# Patient Record
Sex: MALE | Race: WHITE | NOT HISPANIC OR LATINO | Employment: FULL TIME | URBAN - METROPOLITAN AREA
[De-identification: names, ages, dates, MRNs, and addresses within clinical notes are randomized per-mention and may not be internally consistent; named-entity substitution may affect disease eponyms.]

---

## 2023-10-23 ENCOUNTER — TELEPHONE (OUTPATIENT)
Dept: NEUROLOGY | Facility: CLINIC | Age: 53
End: 2023-10-23
Payer: COMMERCIAL

## 2023-10-23 DIAGNOSIS — R56.9 FIRST TIME SEIZURE (MULTI): Primary | ICD-10-CM

## 2023-10-23 NOTE — TELEPHONE ENCOUNTER
Patient called in stating that he was supposed to have labs ordered but when he went to have his labs drawn at the [facility], they did not have anything on file. I did not see this order in his chart. Can you place order?     Patient would like us to call once ordered and faxed to [facility] at [phone]

## 2024-02-07 ENCOUNTER — OFFICE VISIT (OUTPATIENT)
Dept: NEUROLOGY | Facility: CLINIC | Age: 54
End: 2024-02-07
Payer: COMMERCIAL

## 2024-02-07 DIAGNOSIS — R56.9 SEIZURE (MULTI): Primary | ICD-10-CM

## 2024-02-07 DIAGNOSIS — Q28.3: ICD-10-CM

## 2024-02-07 PROCEDURE — 99214 OFFICE O/P EST MOD 30 MIN: CPT | Performed by: PSYCHIATRY & NEUROLOGY

## 2024-02-07 RX ORDER — LEVETIRACETAM 750 MG/1
750 TABLET ORAL 2 TIMES DAILY
COMMUNITY
End: 2024-02-07 | Stop reason: SDUPTHER

## 2024-02-07 RX ORDER — LEVETIRACETAM 750 MG/1
750 TABLET ORAL 2 TIMES DAILY
Qty: 60 TABLET | Refills: 11 | Status: SHIPPED | OUTPATIENT
Start: 2024-02-07 | End: 2024-05-09 | Stop reason: SDUPTHER

## 2024-02-07 NOTE — PROGRESS NOTES
Chief Complaint: Seizure    HPI  53 y.o. male presenting for follow up regarding seizures.    The patient denies any specific seizures since the last visit.  Specifically, there was no evidence of loss of consciousness, convulsions, myoclonus, staring spells, alise vu, jammais vu, olfactory aura, gustatory aura, rising epigastric aura, or aphasia.    His last seizure was 1 year ago.    He is on Keppra 750 mg BID.  He denies any side effects.        Current Outpatient Medications:     levETIRAcetam (Keppra) 750 mg tablet, Take 1 tablet (750 mg) by mouth 2 times a day., Disp: , Rfl:      Objective:  Gen: NAD  Neuro:  --HIF: A&O X 3, repetition and naming intact  --CN:  PERRLA, EOMI, VFF, no visible facial asymmetry, facial sensation intact, no tongue or palatal deviation, SCM intact  --Motor: Moves all 4 extremities equally; no focal deficits  --Sensory: Intact to light touch, intact to pinprick  --Reflex: 2+ symmetric, toes down  --Cerebellum: FTN and HTS intact  --Gait: Normal, narrow based gait    Relevant Results      Assessment:   First Time Seizure  Developmental Venous Anomaly    Plan:  Continue Keppra 750 mg BID  Repeat MRI Brain with contrast to assess stability  Okay to drive as he has been seizure free for greater than 6 months  Follow up in 6 months    Loyd Noriega MD  [facility]  Department of Neurology

## 2024-05-01 ENCOUNTER — HOSPITAL ENCOUNTER (OUTPATIENT)
Dept: RADIOLOGY | Facility: CLINIC | Age: 54
Discharge: HOME | End: 2024-05-01
Payer: COMMERCIAL

## 2024-05-01 DIAGNOSIS — Q28.3: ICD-10-CM

## 2024-05-01 DIAGNOSIS — R56.9 SEIZURE (MULTI): ICD-10-CM

## 2024-05-01 PROCEDURE — 70553 MRI BRAIN STEM W/O & W/DYE: CPT | Performed by: RADIOLOGY

## 2024-05-01 PROCEDURE — A9575 INJ GADOTERATE MEGLUMI 0.1ML: HCPCS | Performed by: PSYCHIATRY & NEUROLOGY

## 2024-05-01 PROCEDURE — 70553 MRI BRAIN STEM W/O & W/DYE: CPT

## 2024-05-01 PROCEDURE — 2550000001 HC RX 255 CONTRASTS: Performed by: PSYCHIATRY & NEUROLOGY

## 2024-05-01 RX ORDER — GADOTERATE MEGLUMINE 376.9 MG/ML
16 INJECTION INTRAVENOUS
Status: COMPLETED | OUTPATIENT
Start: 2024-05-01 | End: 2024-05-01

## 2024-05-01 RX ADMIN — GADOTERATE MEGLUMINE 16 ML: 376.9 INJECTION INTRAVENOUS at 14:23

## 2024-05-09 ENCOUNTER — PATIENT MESSAGE (OUTPATIENT)
Dept: NEUROLOGY | Facility: CLINIC | Age: 54
End: 2024-05-09
Payer: COMMERCIAL

## 2024-05-09 DIAGNOSIS — Q28.3: ICD-10-CM

## 2024-05-09 DIAGNOSIS — R56.9 SEIZURE (MULTI): ICD-10-CM

## 2024-05-09 RX ORDER — LEVETIRACETAM 750 MG/1
750 TABLET ORAL 2 TIMES DAILY
Qty: 60 TABLET | Refills: 11 | Status: SHIPPED | OUTPATIENT
Start: 2024-05-09

## 2024-08-07 ENCOUNTER — APPOINTMENT (OUTPATIENT)
Dept: NEUROLOGY | Facility: CLINIC | Age: 54
End: 2024-08-07
Payer: COMMERCIAL

## 2024-09-07 ENCOUNTER — PATIENT MESSAGE (OUTPATIENT)
Dept: NEUROLOGY | Facility: CLINIC | Age: 54
End: 2024-09-07
Payer: COMMERCIAL

## 2024-09-07 DIAGNOSIS — Q28.3: ICD-10-CM

## 2024-09-07 DIAGNOSIS — R56.9 SEIZURE (MULTI): ICD-10-CM

## 2024-09-09 RX ORDER — LEVETIRACETAM 750 MG/1
750 TABLET ORAL 2 TIMES DAILY
Qty: 60 TABLET | Refills: 11 | Status: SHIPPED | OUTPATIENT
Start: 2024-09-09

## 2024-11-21 ENCOUNTER — APPOINTMENT (OUTPATIENT)
Dept: NEUROLOGY | Facility: CLINIC | Age: 54
End: 2024-11-21
Payer: COMMERCIAL

## 2024-11-21 VITALS
DIASTOLIC BLOOD PRESSURE: 78 MMHG | WEIGHT: 178.6 LBS | TEMPERATURE: 97.9 F | HEART RATE: 68 BPM | BODY MASS INDEX: 26.45 KG/M2 | SYSTOLIC BLOOD PRESSURE: 124 MMHG | HEIGHT: 69 IN

## 2024-11-21 DIAGNOSIS — R56.9 SEIZURE (MULTI): ICD-10-CM

## 2024-11-21 DIAGNOSIS — Q28.3: ICD-10-CM

## 2024-11-21 PROCEDURE — 3008F BODY MASS INDEX DOCD: CPT | Performed by: PSYCHIATRY & NEUROLOGY

## 2024-11-21 PROCEDURE — 99214 OFFICE O/P EST MOD 30 MIN: CPT | Performed by: PSYCHIATRY & NEUROLOGY

## 2024-11-21 RX ORDER — LEVETIRACETAM 750 MG/1
750 TABLET ORAL 2 TIMES DAILY
Qty: 60 TABLET | Refills: 11 | Status: SHIPPED | OUTPATIENT
Start: 2024-11-21

## 2024-11-21 ASSESSMENT — LIFESTYLE VARIABLES
HOW OFTEN DO YOU HAVE SIX OR MORE DRINKS ON ONE OCCASION: NEVER
HOW OFTEN DO YOU HAVE A DRINK CONTAINING ALCOHOL: 2-3 TIMES A WEEK
SKIP TO QUESTIONS 9-10: 1
HOW MANY STANDARD DRINKS CONTAINING ALCOHOL DO YOU HAVE ON A TYPICAL DAY: 1 OR 2
AUDIT-C TOTAL SCORE: 3

## 2024-11-21 ASSESSMENT — PATIENT HEALTH QUESTIONNAIRE - PHQ9
1. LITTLE INTEREST OR PLEASURE IN DOING THINGS: NOT AT ALL
SUM OF ALL RESPONSES TO PHQ9 QUESTIONS 1 & 2: 0
2. FEELING DOWN, DEPRESSED OR HOPELESS: NOT AT ALL

## 2024-11-21 NOTE — PROGRESS NOTES
Chief Complaint: Seizure    HPI  54 y.o. male presenting for follow up regarding seizures.    The patient denies any specific seizures since the last visit.  Specifically, there was no evidence of loss of consciousness, convulsions, myoclonus, staring spells, alise vu, jammais vu, olfactory aura, gustatory aura, rising epigastric aura, or aphasia.    He is on Keppra 750 mg BIDE.  He notes mild drowsiness.          Current Outpatient Medications:     levETIRAcetam (Keppra) 750 mg tablet, Take 1 tablet (750 mg) by mouth 2 times a day., Disp: 60 tablet, Rfl: 11      Objective:  Gen: NAD  Neuro:  --HIF: A&O X 3, repetition and naming intact  --CN:  PERRLA, EOMI, VFF, no visible facial asymmetry, facial sensation intact, no tongue or palatal deviation, SCM intact  --Motor: Moves all 4 extremities equally; no focal deficits  --Sensory: Intact to light touch, intact to pinprick  --Reflex: 2+ symmetric, toes down  --Cerebellum: FTN and HTS intact  --Gait: Normal, narrow based gait    Relevant Results      Assessment:    First Time Seizure  Developmental Venous Anomaly    Plan:  - continue Keppra 750 mg BID  - consider reducing dose to 500 mg BID  - follow up in 6 months    Loyd Noriega MD  [facility]  Department of Neurology

## 2025-05-09 ENCOUNTER — OFFICE VISIT (OUTPATIENT)
Dept: URGENT CARE | Age: 55
End: 2025-05-09
Payer: COMMERCIAL

## 2025-05-09 VITALS
RESPIRATION RATE: 16 BRPM | DIASTOLIC BLOOD PRESSURE: 75 MMHG | OXYGEN SATURATION: 99 % | WEIGHT: 175 LBS | HEIGHT: 69 IN | SYSTOLIC BLOOD PRESSURE: 113 MMHG | BODY MASS INDEX: 25.92 KG/M2 | HEART RATE: 65 BPM | TEMPERATURE: 97.9 F

## 2025-05-09 DIAGNOSIS — H60.393 OTHER INFECTIVE ACUTE OTITIS EXTERNA OF BOTH EARS: Primary | ICD-10-CM

## 2025-05-09 RX ORDER — NEOMYCIN SULFATE, POLYMYXIN B SULFATE, HYDROCORTISONE 3.5; 10000; 1 MG/ML; [USP'U]/ML; MG/ML
3 SOLUTION/ DROPS AURICULAR (OTIC) 4 TIMES DAILY
Qty: 4.2 ML | Refills: 0 | Status: SHIPPED | OUTPATIENT
Start: 2025-05-09 | End: 2025-05-16

## 2025-05-09 NOTE — PROGRESS NOTES
"Subjective   Patient ID: Luis Haddad is a 54 y.o. male. They present today with a chief complaint of No chief complaint on file..    History of Present Illness  Subjective  Luis Haddad is a 54 y.o. male who presents for evaluation of bilateral ear pain and plugged sensation in both ears. Symptoms have been present 2 weeks. Associated symptoms include earaches. Patient denies ear drainage, epistaxis, hoarseness, nasal congestion, sore throat, and tinnitus. He does not have a history of ear infections. He does have a history of swimming. He has tried OTC swimmer's ear drops for his symptoms.    Review of Systems   Constitutional:  Denies fever, chills, malaise, fatigue   ENT: See HPI  Respiratory:  Denies cough, sputum production, shortness of breath, wheezing.    Cardiovascular:  Denies chest pain, palpitations, syncope, lightheadedness, dizziness.    Gastrointestinal:  Denies abdominal pain, nausea, vomiting, diarrhea.    Integumentary:  Denies rash.    All other systems are negative    Objective  /75   Pulse 65   Temp 36.6 °C (97.9 °F)   Resp 16   Ht 1.753 m (5' 9\")   Wt 79.4 kg (175 lb)   SpO2 99%   BMI 25.84 kg/m²   Physical Exam  Vitals and nursing note reviewed.   Constitutional:       General: He is not in acute distress. Alert and oriented.      Appearance: Normal appearance. He is normal weight. He is not ill-appearing, toxic-appearing or diaphoretic.   Right Ear:  right TM normal landmarks and mobility and right canal red and inflamed  HENT:      Nose: Nose normal. No congestion or rhinorrhea.   Left Ear:   left TM normal landmarks and mobility and left canal red and inflamed  Eyes:      General: No scleral icterus.        Right eye: No discharge.         Left eye: No discharge.      Conjunctiva/sclera: Conjunctivae normal.   Cardiovascular:      Rate and Rhythm: Normal rate.   Pulmonary:      Effort: Pulmonary effort is normal.   Skin:     General: Skin is warm and dry.      Coloration: " Skin is not jaundiced or pale.      Findings: No bruising, erythema or rash.   Neurological:      General: No focal deficit present.      Mental Status: He is alert and oriented to person, place, and time.             History provided by:  Patient   used: No        Past Medical History  Allergies as of 05/09/2025 - Reviewed 05/09/2025   Allergen Reaction Noted    Penicillins Rash and Unknown 06/06/2006       Prescriptions Prior to Admission[1]     Medical History[2]    Surgical History[3]     reports that he has been smoking cigarettes. He has never used smokeless tobacco. He reports current alcohol use. He reports that he does not use drugs.    Review of Systems  Review of Systems                               Objective    There were no vitals filed for this visit.  No LMP for male patient.    Physical Exam  Vitals and nursing note reviewed.         Procedures    Point of Care Test & Imaging Results from this visit  No results found for this visit on 05/09/25.   Imaging  No results found.    Cardiology, Vascular, and Other Imaging  No other imaging results found for the past 2 days      Diagnostic study results (if any) were reviewed by HARLEY Gomez.    Assessment/Plan   Allergies, medications, history, and pertinent labs/EKGs/Imaging reviewed by HARLEY Gomez.     Medical Decision Making     Based on history and physical exam, findings consistent with uncomplicated otitis?externa. No evidence of?foreign?body,?obstruction, deep tissue infection, mastoiditis. Antibiotic ear drops prescribed. Encouraged continuation of symptomatic and supportive care?measures.? RTC with any new or worsening symptoms. Pt.?verbalized?understanding and agreeable with plan.?       Orders and Diagnoses  There are no diagnoses linked to this encounter.    Medical Admin Record      Patient disposition: Home    Electronically signed by HARLEY Gomez  11:50 AM           [1] (Not in a  hospital admission)  [2] History reviewed. No pertinent past medical history.  [3] History reviewed. No pertinent surgical history.

## 2025-05-13 ENCOUNTER — APPOINTMENT (OUTPATIENT)
Dept: NEUROLOGY | Facility: CLINIC | Age: 55
End: 2025-05-13
Payer: COMMERCIAL

## 2025-05-13 VITALS
HEIGHT: 69 IN | TEMPERATURE: 98 F | BODY MASS INDEX: 26.51 KG/M2 | DIASTOLIC BLOOD PRESSURE: 72 MMHG | WEIGHT: 179 LBS | HEART RATE: 72 BPM | SYSTOLIC BLOOD PRESSURE: 108 MMHG

## 2025-05-13 DIAGNOSIS — Q28.3: ICD-10-CM

## 2025-05-13 DIAGNOSIS — R56.9 SEIZURE (MULTI): ICD-10-CM

## 2025-05-13 PROCEDURE — 3008F BODY MASS INDEX DOCD: CPT | Performed by: PSYCHIATRY & NEUROLOGY

## 2025-05-13 PROCEDURE — 99213 OFFICE O/P EST LOW 20 MIN: CPT | Performed by: PSYCHIATRY & NEUROLOGY

## 2025-05-13 RX ORDER — LEVETIRACETAM 500 MG/1
500 TABLET ORAL 2 TIMES DAILY
Qty: 180 TABLET | Refills: 4 | Status: SHIPPED | OUTPATIENT
Start: 2025-05-13

## 2025-05-13 ASSESSMENT — LIFESTYLE VARIABLES
HOW OFTEN DO YOU HAVE SIX OR MORE DRINKS ON ONE OCCASION: NEVER
AUDIT-C TOTAL SCORE: 3
SKIP TO QUESTIONS 9-10: 1
HOW OFTEN DO YOU HAVE A DRINK CONTAINING ALCOHOL: 2-3 TIMES A WEEK
HOW MANY STANDARD DRINKS CONTAINING ALCOHOL DO YOU HAVE ON A TYPICAL DAY: 1 OR 2

## 2025-05-13 ASSESSMENT — PATIENT HEALTH QUESTIONNAIRE - PHQ9
2. FEELING DOWN, DEPRESSED OR HOPELESS: NOT AT ALL
SUM OF ALL RESPONSES TO PHQ9 QUESTIONS 1 & 2: 0
1. LITTLE INTEREST OR PLEASURE IN DOING THINGS: NOT AT ALL

## 2025-05-13 NOTE — PROGRESS NOTES
Chief Complaint: Seizure Follow Up    HPI  54 y.o. male presenting for follow up regarding seizures.    The patient denies any specific seizures since the last visit.  Specifically, there was no evidence of loss of consciousness, convulsions, myoclonus, staring spells, alise vu, jammais vu, olfactory aura, gustatory aura, rising epigastric aura, or aphasia.    He is on Keppra 750 mg BID.  He does note some drowsiness.        Current Medications[1]      Objective:  Gen: NAD  Neuro:  --HIF: A&O X 3, repetition and naming intact  --CN:  PERRLA, EOMI, VFF, no visible facial asymmetry, facial sensation intact, no tongue or palatal deviation, SCM intact  --Motor: Moves all 4 extremities equally; no focal deficits  --Sensory: Intact to light touch, intact to pinprick  --Reflex: 2+ symmetric, toes down  --Cerebellum: FTN and HTS intact  --Gait: Normal, narrow based gait    Relevant Results      Assessment:    First Time Seizure, DVA  - no seizures since the last visit  - he does note drowsiness and mild irritability with Keppra    Plan:  - recommend Keppra 500 mg BID  - okay to drive  - follow up in 1 year    Loyd Noriega MD  [facility]  Department of Neurology         [1]   Current Outpatient Medications:     levETIRAcetam (Keppra) 750 mg tablet, Take 1 tablet (750 mg) by mouth 2 times a day., Disp: 60 tablet, Rfl: 11    neomycin-polymyxin-HC (Cortisporin) otic solution, Administer 3 drops into each ear 4 times a day for 7 days., Disp: 4.2 mL, Rfl: 0

## 2025-05-13 NOTE — PROGRESS NOTES
Subjective   Luis Haddad is a 54 y.o.   male seen in follow-up for a seizure disorder. He was accompanied by spouse. Since the last clinic visit, there have been no seizures or other aura-like symptoms.  There have been no unusual nocturnal symptoms suggestive of seizures.. Medication compliance: excellent. There are no medication side effects.  Social History: The patient is currently employed. The patient is not currently on disability. The patient is driving. The patient reports having a valid ’s license.

## 2026-05-18 ENCOUNTER — APPOINTMENT (OUTPATIENT)
Dept: NEUROLOGY | Facility: CLINIC | Age: 56
End: 2026-05-18
Payer: COMMERCIAL